# Patient Record
Sex: FEMALE | Race: BLACK OR AFRICAN AMERICAN | NOT HISPANIC OR LATINO | URBAN - METROPOLITAN AREA
[De-identification: names, ages, dates, MRNs, and addresses within clinical notes are randomized per-mention and may not be internally consistent; named-entity substitution may affect disease eponyms.]

---

## 2018-02-26 ENCOUNTER — EMERGENCY (EMERGENCY)
Facility: HOSPITAL | Age: 33
LOS: 1 days | Discharge: HOME | End: 2018-02-26
Attending: EMERGENCY MEDICINE | Admitting: EMERGENCY MEDICINE
Payer: OTHER MISCELLANEOUS

## 2018-02-26 VITALS
RESPIRATION RATE: 16 BRPM | OXYGEN SATURATION: 100 % | DIASTOLIC BLOOD PRESSURE: 75 MMHG | TEMPERATURE: 98 F | HEART RATE: 71 BPM | SYSTOLIC BLOOD PRESSURE: 114 MMHG

## 2018-02-26 PROCEDURE — 99283 EMERGENCY DEPT VISIT LOW MDM: CPT

## 2018-02-26 RX ORDER — CYCLOBENZAPRINE HYDROCHLORIDE 10 MG/1
1 TABLET, FILM COATED ORAL
Qty: 9 | Refills: 0 | OUTPATIENT
Start: 2018-02-26 | End: 2018-02-28

## 2018-02-26 RX ORDER — KETOROLAC TROMETHAMINE 30 MG/ML
30 SYRINGE (ML) INJECTION ONCE
Qty: 0 | Refills: 0 | Status: DISCONTINUED | OUTPATIENT
Start: 2018-02-26 | End: 2018-02-26

## 2018-02-26 RX ORDER — CYCLOBENZAPRINE HYDROCHLORIDE 10 MG/1
10 TABLET, FILM COATED ORAL ONCE
Qty: 0 | Refills: 0 | Status: COMPLETED | OUTPATIENT
Start: 2018-02-26 | End: 2018-02-26

## 2018-02-26 RX ADMIN — Medication 30 MILLIGRAM(S): at 10:50

## 2018-02-26 RX ADMIN — CYCLOBENZAPRINE HYDROCHLORIDE 10 MILLIGRAM(S): 10 TABLET, FILM COATED ORAL at 10:50

## 2018-02-26 NOTE — ED PROVIDER NOTE - NEUROLOGICAL LEVEL OF CONSCIOUSNESS
negative straight leg raise b/l, S1 intact b/l, no objective saddle anesthesia./follows commands/alert

## 2018-02-26 NOTE — ED PROVIDER NOTE - OBJECTIVE STATEMENT
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 No associated urinary/fecal incontinence or retention. No LE weakness or paresthesias. No saddle anesthesia. No h/o direct trauma to the back. Pt took one dose of ASA for pain early this morning. Pain is exacerbated by movement.

## 2018-02-26 NOTE — ED ADULT NURSE REASSESSMENT NOTE - NS ED NURSE REASSESS COMMENT FT1
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   Medicated as ordered.  Ting Diaz RN

## 2023-08-01 ENCOUNTER — EMERGENCY (EMERGENCY)
Facility: HOSPITAL | Age: 38
LOS: 1 days | Discharge: HOME | End: 2023-08-01
Attending: STUDENT IN AN ORGANIZED HEALTH CARE EDUCATION/TRAINING PROGRAM | Admitting: STUDENT IN AN ORGANIZED HEALTH CARE EDUCATION/TRAINING PROGRAM
Payer: MEDICAID

## 2023-08-01 VITALS
HEART RATE: 75 BPM | DIASTOLIC BLOOD PRESSURE: 74 MMHG | TEMPERATURE: 98 F | OXYGEN SATURATION: 100 % | RESPIRATION RATE: 18 BRPM | SYSTOLIC BLOOD PRESSURE: 129 MMHG

## 2023-08-01 PROCEDURE — 99283 EMERGENCY DEPT VISIT LOW MDM: CPT

## 2023-08-01 NOTE — ED PROVIDER NOTE - PATIENT PORTAL LINK FT
You can access the FollowMyHealth Patient Portal offered by [facility] by registering at the following website: [email or web address] By joining [facility]’s FollowMyHealth portal, you will also be able to view your health information using other applications (apps) compatible with our system.

## 2023-08-01 NOTE — ED PROVIDER NOTE - NSFOLLOWUPINSTRUCTIONS_ED_ALL_ED_FT
You likely coxackie. Coxackie is a viral infection and requires symptomatic support (drink fluids, ea, and rest). Please treat symptomatically with Ibuprofen    Please return to the ED if you experience chest pain, shortness of breath, difficulty breathing, or any other alarming symptoms.       You may take 500-1000 mg acetaminophen (tylenol) every 6 hours, as needed for fever.

## 2023-08-01 NOTE — ED PROVIDER NOTE - NS ED ROS FT
GENERAL: Denies weight loss, fever and chills.  EYES: denies change in vision,   HEENT: denies trouble swallowing or speaking,   CARDIAC: denies chest pain or palpitations   PULMONARY: denies cough or SOB,  GI: denies abdominal pain, no nausea, no vomiting, no diarrhea or constipation,   : Denies dysuria and urinary frequency, hematuria   SKIN: rash on palms of both hands  NEURO: denies headache, dizziness  MSK: Denies myalgia and joint pain.  PSYCHIATRIC: Denies recent changes in mood. Denies anxiety and depression.    All other ROS negative unless otherwise specified in HPI.

## 2023-08-01 NOTE — ED PROVIDER NOTE - PHYSICAL EXAMINATION
GENERAL: Denies weight loss, fever and chills.  EYES: denies change in vision,   HEENT: denies trouble swallowing or speaking,   CARDIAC: denies chest pain or palpitations   PULMONARY: denies cough or SOB,  GI: denies abdominal pain, no nausea, no vomiting, no diarrhea or constipation,   : Denies dysuria and urinary frequency, hematuria   SKIN: Vesicular rash on b/l palms and soles of hands.   NEURO: denies headache, dizziness  MSK: Denies myalgia and joint pain.  PSYCHIATRIC: Denies recent changes in mood. Denies anxiety and depression.    All other ROS negative unless otherwise specified in HPI.

## 2023-08-01 NOTE — ED PROVIDER NOTE - ATTENDING CONTRIBUTION TO CARE
38-year-old female no past medical history, no recent travel presents for evaluation of rash on hands as well as fever last night.  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  Patient denies any throat pain intraoral lesions, headache, chills, neck pain, chest pain, shortness of breath, abdominal pain, nausea, vomiting, dysuria hematuria.  Exam: Well-appearing no acute distress.  Pupils equal reactive to light extract movements intact.  Oropharynx with no lesions normal tonsils.  No cervical of adenopathy.  Lungs clear to auscultation  Regular rate and rhythm  + S1, S2  Abdomen soft nontender.  Awake alert oriented x 3.  Forage motion of all extremities no midline or paraspinal tenderness.  Raised macular rash over palmar surface of hands bilaterally.  Patient presenting with fever and rash after recent exposure to multiple children at her  with coxsackie.  Suspect right viral illness likely coxsackie.  Anticipatory guidance and return precaution discussed with patient at bedside.  Stable for discharge.

## 2023-08-01 NOTE — ED PROVIDER NOTE - OBJECTIVE STATEMENT
38 y.o F with no PMHx presents to ED for fever and rash on hands. 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 States she developed a fever of 101 at home last night, and today she noticed vescicles on the palmar surface of her hand. States she took tylnol yesterday at 10pm at home with mild reduction of fever. Denies presence of vesicles on foot or mouth. Denies chills, nausea, vomiting, dizziness, chest pain, SOB, abdominal pain, dysuria, hematuria.

## 2023-08-01 NOTE — ED PROVIDER NOTE - CLINICAL SUMMARY MEDICAL DECISION MAKING FREE TEXT BOX
38 y.o F with no PMHx presents to ED for fever and rash on hands. 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 Denies chest pain, SOB, hematuria. VSS. Clinically stable. Physical exam remarkable for several papular 1cm elevated spots on palmar and dorsal aspect of hand. Clinical suspicion for coxackie disease. Will treat symptomatically. Discharge.
